# Patient Record
Sex: MALE | Race: ASIAN | NOT HISPANIC OR LATINO | URBAN - METROPOLITAN AREA
[De-identification: names, ages, dates, MRNs, and addresses within clinical notes are randomized per-mention and may not be internally consistent; named-entity substitution may affect disease eponyms.]

---

## 2023-09-14 VITALS
RESPIRATION RATE: 16 BRPM | HEIGHT: 67 IN | SYSTOLIC BLOOD PRESSURE: 195 MMHG | OXYGEN SATURATION: 99 % | WEIGHT: 153 LBS | DIASTOLIC BLOOD PRESSURE: 102 MMHG | HEART RATE: 64 BPM | TEMPERATURE: 97 F

## 2023-09-14 RX ORDER — CHLORHEXIDINE GLUCONATE 213 G/1000ML
1 SOLUTION TOPICAL ONCE
Refills: 0 | Status: DISCONTINUED | OUTPATIENT
Start: 2023-09-20 | End: 2023-10-04

## 2023-09-14 NOTE — H&P ADULT - HISTORY OF PRESENT ILLNESS
[facility]     Cardiologist: Dr. Laboy  Escort: To be determined on arrival   Pharmacy:     CONFIRM MEDICATIONS UPON ARRIVAL     Patient is a   69 y/o  Chinese speaking current smoking male with PMHx of HTN, HLD, carotid stenosis, PVD, kidney cyst, thyroid nodule, syncope (hospitalized 6-7 years prior) who presented to his cardiologist with complaints of intermittent, non-radiating, substernal chest pain, that has been intermittent, but gradually worsening over the past few weeks, occurring with moderate exertion (walking less than 2 blocks or climbing less than 1 flight of stairs), and alleviated with rest. Patient also complains of  dizziness .  Patient denies SOB, palpitations, fatigue, dizziness, headache, chills, orthopnea, LE edema, cough, PND, recent travel, or sick contacts.     CCTA 3/3/22: Calcium score is 492 agatson units. Severe mixed disease in prox LAD and RCA. Moderate mixed disease in the mid RCA. Remaining coronary artery stenosis appear non obstructive. Normal aortic dimensions. Normal LV function.     NST 3/27/23: Negative stress ECG for ischemia after adequate exercise. Abnormal myocardial perfusion showing a small amount of ischemia in the inferior location. Overall LV systolic function was normal without regional wall motion abnormalities.    TTE 3/15/23: EF=58%. Aortic valve sclerosis with mild calcification. Mild TR. No pericardial effusion. No pleural effusion. No pulmonary hypertension.     In light of patient's risk factors, CCS Class II anginal symptoms, and abnormal NST, patient presents for left heart cardiac catheterization with possible intervention if clinically indicated.  Cardiologist: Dr. Laboy  Escort: To be determined on arrival   Pharmacy: [facility], [address] [place] [address]    Patient is a  69 y/o  Chinese speaking current smoking male with PMHx of HTN, HLD, carotid stenosis, PVD, kidney cyst, thyroid nodule, syncope (hospitalized 6-7 years prior) who presented to his cardiologist with complaints of intermittent, non-radiating, substernal chest pain, that has been intermittent, but gradually worsening over the past few weeks, occurring with moderate exertion (walking less than 2 blocks or climbing less than 1 flight of stairs), and alleviated with rest. Patient also complains of  dizziness. Patient denies SOB, palpitations, fatigue, dizziness, headache, chills, orthopnea, LE edema, cough, PND, recent travel, or sick contacts.     CCTA (3/3/22): Calcium score is 492 agatson units. Severe mixed disease in prox LAD and RCA. Moderate mixed disease in the mid RCA. Remaining coronary artery stenosis appear non obstructive. Normal aortic dimensions. Normal LV function.     NST (3/27/23): Negative stress ECG for ischemia after adequate exercise. Abnormal myocardial perfusion showing a small amount of ischemia in the inferior location. Overall LV systolic function was normal without regional wall motion abnormalities.    TTE (3/15/23): EF=58%. Aortic valve sclerosis with mild calcification. Mild TR. No pericardial effusion. No pleural effusion. No pulmonary hypertension.     In light of patient's risk factors, CCS Class II anginal symptoms, and abnormal NST, patient presents for left heart cardiac catheterization with possible intervention if clinically indicated.

## 2023-09-14 NOTE — H&P ADULT - ASSESSMENT
Patient is a  67 y/o  Chinese speaking current smoking male with PMHx of HTN, HLD, carotid stenosis, PVD, kidney cyst, thyroid nodule, syncope (hospitalized 6-7 years prior) who in light of patient's risk factors, CCS Class II anginal symptoms, and abnormal NST, patient presents for left heart cardiac catheterization with possible intervention if clinically indicated.     ASA III				Mallampati class: II	            Anginal Class: II    - VS significant for /102 mmHg; patient is asymptomatic and states that he has taken both Losartan 50 mg and Coreg 3.125 mg this morning as prescribed; Dr. Mcnamara (interventional cardiology (IC) fellow) aware and agrees with plan for hydralazine 5 mg IVP and will further monitor and manage BP intra-procedurally.   - H/H stable, patient denies BRBPR, melena, hematuria, or further bleeding.   -  Sedation Plan: Moderate   - Patient Is Suitable Candidate For Sedation? Yes  - Cath Order Entered: Yes  - DAPT LOAD:  Patient is currently on ASA 81 mg daily (last dose 9/19/23; denies further missed doses and reports good adherence); ordered ASA 81 mg PO x1 and Plavix 600 mg POx1.  - PRE-CATH FLUIDS: Yes; patient is euvolemic on exam; Cr 1.02, EF 58% per TTE (3/2023). Discussed pre-cath IV fluid hydration with IC fellow, decision made to order IV NS 75 cc/hr x2 hours (reduced due to elevated BP).  - ALLERGY: no indication for pre-cath steroid prophylaxis   - Informed consent is in the patient's chart. Consent obtained via Language [employer] Video Cantonese  Chris ID #[id number].     Risks Benefits Annotation:     CARDIAC CATH: Risks & benefits of procedure and sedation and risks and benefits for the alternative therapy have been explained to the patient and/or HCP in layman’s terms including but not limited to: allergic reaction, bleeding, infection, arrhythmia, respiratory compromise, renal and vascular compromise, limb damage, MI, CVA, emergent CABG/Vascular Surgery and death. Informed consent obtained and in chart.

## 2023-09-14 NOTE — H&P ADULT - NSHPLABSRESULTS_GEN_ALL_CORE
LABS:                          14.6   6.78  )-----------( 241      ( 20 Sep 2023 08:45 )             43.4     09-20    139  |  103  |  22  ----------------------------<  120<H>  4.4   |  28  |  1.02    Ca    9.4      20 Sep 2023 09:13  Mg     2.3     09-20    TPro  7.2  /  Alb  4.6  /  TBili  0.5  /  DBili  x   /  AST  27  /  ALT  32  /  AlkPhos  67  09-20    LIVER FUNCTIONS - ( 20 Sep 2023 09:13 )  Alb: 4.6 g/dL / Pro: 7.2 g/dL / ALK PHOS: 67 U/L / ALT: 32 U/L / AST: 27 U/L / GGT: x           PT/INR - ( 20 Sep 2023 08:45 )   PT: 10.4 sec;   INR: 0.91          PTT - ( 20 Sep 2023 08:45 )  PTT:32.9 sec  Urinalysis Basic - ( 20 Sep 2023 09:13 )    Color: x / Appearance: x / SG: x / pH: x  Gluc: 120 mg/dL / Ketone: x  / Bili: x / Urobili: x   Blood: x / Protein: x / Nitrite: x   Leuk Esterase: x / RBC: x / WBC x   Sq Epi: x / Non Sq Epi: x / Bacteria: x      EKG 8/23/23: NSR @ 61 bpm

## 2023-09-14 NOTE — H&P ADULT - NSICDXPASTMEDICALHX_GEN_ALL_CORE_FT
PAST MEDICAL HISTORY:  Carotid stenosis     HLD (hyperlipidemia)     HTN (hypertension)      PAST MEDICAL HISTORY:  Carotid stenosis     History of thyroid nodule     HLD (hyperlipidemia)     HTN (hypertension)     Kidney cysts     PVD (peripheral vascular disease)

## 2023-09-20 ENCOUNTER — OUTPATIENT (OUTPATIENT)
Dept: OUTPATIENT SERVICES | Facility: HOSPITAL | Age: 68
LOS: 1 days | Discharge: HOME | End: 2023-09-20
Payer: MEDICARE

## 2023-09-20 LAB
A1C WITH ESTIMATED AVERAGE GLUCOSE RESULT: 5.8 % — HIGH (ref 4–5.6)
ALBUMIN SERPL ELPH-MCNC: 4.6 G/DL — SIGNIFICANT CHANGE UP (ref 3.3–5)
ALP SERPL-CCNC: 67 U/L — SIGNIFICANT CHANGE UP (ref 40–120)
ALT FLD-CCNC: 32 U/L — SIGNIFICANT CHANGE UP (ref 10–45)
ANION GAP SERPL CALC-SCNC: 8 MMOL/L — SIGNIFICANT CHANGE UP (ref 5–17)
ANION GAP SERPL CALC-SCNC: 8 MMOL/L — SIGNIFICANT CHANGE UP (ref 5–17)
APTT BLD: 32.9 SEC — SIGNIFICANT CHANGE UP (ref 24.5–35.6)
AST SERPL-CCNC: 27 U/L — SIGNIFICANT CHANGE UP (ref 10–40)
BASOPHILS # BLD AUTO: 0.11 K/UL — SIGNIFICANT CHANGE UP (ref 0–0.2)
BASOPHILS NFR BLD AUTO: 1.6 % — SIGNIFICANT CHANGE UP (ref 0–2)
BILIRUB SERPL-MCNC: 0.5 MG/DL — SIGNIFICANT CHANGE UP (ref 0.2–1.2)
BUN SERPL-MCNC: 18 MG/DL — SIGNIFICANT CHANGE UP (ref 7–23)
BUN SERPL-MCNC: 22 MG/DL — SIGNIFICANT CHANGE UP (ref 7–23)
CALCIUM SERPL-MCNC: 9.1 MG/DL — SIGNIFICANT CHANGE UP (ref 8.4–10.5)
CALCIUM SERPL-MCNC: 9.4 MG/DL — SIGNIFICANT CHANGE UP (ref 8.4–10.5)
CHLORIDE SERPL-SCNC: 103 MMOL/L — SIGNIFICANT CHANGE UP (ref 96–108)
CHLORIDE SERPL-SCNC: 106 MMOL/L — SIGNIFICANT CHANGE UP (ref 96–108)
CHOLEST SERPL-MCNC: 152 MG/DL — SIGNIFICANT CHANGE UP
CK MB CFR SERPL CALC: 2.2 NG/ML — SIGNIFICANT CHANGE UP (ref 0–6.7)
CK SERPL-CCNC: 141 U/L — SIGNIFICANT CHANGE UP (ref 30–200)
CO2 SERPL-SCNC: 26 MMOL/L — SIGNIFICANT CHANGE UP (ref 22–31)
CO2 SERPL-SCNC: 28 MMOL/L — SIGNIFICANT CHANGE UP (ref 22–31)
CREAT SERPL-MCNC: 0.84 MG/DL — SIGNIFICANT CHANGE UP (ref 0.5–1.3)
CREAT SERPL-MCNC: 1.02 MG/DL — SIGNIFICANT CHANGE UP (ref 0.5–1.3)
EGFR: 79 ML/MIN/1.73M2 — SIGNIFICANT CHANGE UP
EGFR: 94 ML/MIN/1.73M2 — SIGNIFICANT CHANGE UP
EOSINOPHIL # BLD AUTO: 0.44 K/UL — SIGNIFICANT CHANGE UP (ref 0–0.5)
EOSINOPHIL NFR BLD AUTO: 6.5 % — HIGH (ref 0–6)
ESTIMATED AVERAGE GLUCOSE: 120 MG/DL — HIGH (ref 68–114)
GLUCOSE SERPL-MCNC: 119 MG/DL — HIGH (ref 70–99)
GLUCOSE SERPL-MCNC: 120 MG/DL — HIGH (ref 70–99)
HCT VFR BLD CALC: 39.7 % — SIGNIFICANT CHANGE UP (ref 39–50)
HCT VFR BLD CALC: 43.4 % — SIGNIFICANT CHANGE UP (ref 39–50)
HCV AB S/CO SERPL IA: 0.05 S/CO — SIGNIFICANT CHANGE UP
HCV AB SERPL-IMP: SIGNIFICANT CHANGE UP
HDLC SERPL-MCNC: 50 MG/DL — SIGNIFICANT CHANGE UP
HGB BLD-MCNC: 13.8 G/DL — SIGNIFICANT CHANGE UP (ref 13–17)
HGB BLD-MCNC: 14.6 G/DL — SIGNIFICANT CHANGE UP (ref 13–17)
IMM GRANULOCYTES NFR BLD AUTO: 0.1 % — SIGNIFICANT CHANGE UP (ref 0–0.9)
INR BLD: 0.91 — SIGNIFICANT CHANGE UP (ref 0.85–1.18)
LIPID PNL WITH DIRECT LDL SERPL: 83 MG/DL — SIGNIFICANT CHANGE UP
LYMPHOCYTES # BLD AUTO: 2.27 K/UL — SIGNIFICANT CHANGE UP (ref 1–3.3)
LYMPHOCYTES # BLD AUTO: 33.5 % — SIGNIFICANT CHANGE UP (ref 13–44)
MAGNESIUM SERPL-MCNC: 2.1 MG/DL — SIGNIFICANT CHANGE UP (ref 1.6–2.6)
MAGNESIUM SERPL-MCNC: 2.3 MG/DL — SIGNIFICANT CHANGE UP (ref 1.6–2.6)
MCHC RBC-ENTMCNC: 31.3 PG — SIGNIFICANT CHANGE UP (ref 27–34)
MCHC RBC-ENTMCNC: 31.7 PG — SIGNIFICANT CHANGE UP (ref 27–34)
MCHC RBC-ENTMCNC: 33.6 GM/DL — SIGNIFICANT CHANGE UP (ref 32–36)
MCHC RBC-ENTMCNC: 34.8 GM/DL — SIGNIFICANT CHANGE UP (ref 32–36)
MCV RBC AUTO: 91.3 FL — SIGNIFICANT CHANGE UP (ref 80–100)
MCV RBC AUTO: 92.9 FL — SIGNIFICANT CHANGE UP (ref 80–100)
MONOCYTES # BLD AUTO: 0.58 K/UL — SIGNIFICANT CHANGE UP (ref 0–0.9)
MONOCYTES NFR BLD AUTO: 8.6 % — SIGNIFICANT CHANGE UP (ref 2–14)
NEUTROPHILS # BLD AUTO: 3.37 K/UL — SIGNIFICANT CHANGE UP (ref 1.8–7.4)
NEUTROPHILS NFR BLD AUTO: 49.7 % — SIGNIFICANT CHANGE UP (ref 43–77)
NON HDL CHOLESTEROL: 102 MG/DL — SIGNIFICANT CHANGE UP
NRBC # BLD: 0 /100 WBCS — SIGNIFICANT CHANGE UP (ref 0–0)
NRBC # BLD: 0 /100 WBCS — SIGNIFICANT CHANGE UP (ref 0–0)
PLATELET # BLD AUTO: 214 K/UL — SIGNIFICANT CHANGE UP (ref 150–400)
PLATELET # BLD AUTO: 241 K/UL — SIGNIFICANT CHANGE UP (ref 150–400)
POTASSIUM SERPL-MCNC: 3.7 MMOL/L — SIGNIFICANT CHANGE UP (ref 3.5–5.3)
POTASSIUM SERPL-MCNC: 4.4 MMOL/L — SIGNIFICANT CHANGE UP (ref 3.5–5.3)
POTASSIUM SERPL-SCNC: 3.7 MMOL/L — SIGNIFICANT CHANGE UP (ref 3.5–5.3)
POTASSIUM SERPL-SCNC: 4.4 MMOL/L — SIGNIFICANT CHANGE UP (ref 3.5–5.3)
PROT SERPL-MCNC: 7.2 G/DL — SIGNIFICANT CHANGE UP (ref 6–8.3)
PROTHROM AB SERPL-ACNC: 10.4 SEC — SIGNIFICANT CHANGE UP (ref 9.5–13)
RBC # BLD: 4.35 M/UL — SIGNIFICANT CHANGE UP (ref 4.2–5.8)
RBC # BLD: 4.67 M/UL — SIGNIFICANT CHANGE UP (ref 4.2–5.8)
RBC # FLD: 13 % — SIGNIFICANT CHANGE UP (ref 10.3–14.5)
RBC # FLD: 13.1 % — SIGNIFICANT CHANGE UP (ref 10.3–14.5)
SODIUM SERPL-SCNC: 139 MMOL/L — SIGNIFICANT CHANGE UP (ref 135–145)
SODIUM SERPL-SCNC: 140 MMOL/L — SIGNIFICANT CHANGE UP (ref 135–145)
TRIGL SERPL-MCNC: 97 MG/DL — SIGNIFICANT CHANGE UP
WBC # BLD: 6.78 K/UL — SIGNIFICANT CHANGE UP (ref 3.8–10.5)
WBC # BLD: 8.18 K/UL — SIGNIFICANT CHANGE UP (ref 3.8–10.5)
WBC # FLD AUTO: 6.78 K/UL — SIGNIFICANT CHANGE UP (ref 3.8–10.5)
WBC # FLD AUTO: 8.18 K/UL — SIGNIFICANT CHANGE UP (ref 3.8–10.5)

## 2023-09-20 PROCEDURE — 85027 COMPLETE CBC AUTOMATED: CPT

## 2023-09-20 PROCEDURE — 93010 ELECTROCARDIOGRAM REPORT: CPT

## 2023-09-20 PROCEDURE — 36415 COLL VENOUS BLD VENIPUNCTURE: CPT

## 2023-09-20 PROCEDURE — 85730 THROMBOPLASTIN TIME PARTIAL: CPT

## 2023-09-20 PROCEDURE — 80061 LIPID PANEL: CPT

## 2023-09-20 PROCEDURE — C1894: CPT

## 2023-09-20 PROCEDURE — C1874: CPT

## 2023-09-20 PROCEDURE — 92978 ENDOLUMINL IVUS OCT C 1ST: CPT | Mod: LD

## 2023-09-20 PROCEDURE — 82550 ASSAY OF CK (CPK): CPT

## 2023-09-20 PROCEDURE — C1753: CPT

## 2023-09-20 PROCEDURE — 99152 MOD SED SAME PHYS/QHP 5/>YRS: CPT

## 2023-09-20 PROCEDURE — C1725: CPT

## 2023-09-20 PROCEDURE — 80048 BASIC METABOLIC PNL TOTAL CA: CPT

## 2023-09-20 PROCEDURE — 93005 ELECTROCARDIOGRAM TRACING: CPT

## 2023-09-20 PROCEDURE — C9600: CPT | Mod: LD

## 2023-09-20 PROCEDURE — 83036 HEMOGLOBIN GLYCOSYLATED A1C: CPT

## 2023-09-20 PROCEDURE — 99153 MOD SED SAME PHYS/QHP EA: CPT

## 2023-09-20 PROCEDURE — 92928 PRQ TCAT PLMT NTRAC ST 1 LES: CPT | Mod: LD

## 2023-09-20 PROCEDURE — C1887: CPT

## 2023-09-20 PROCEDURE — C1769: CPT

## 2023-09-20 PROCEDURE — 93458 L HRT ARTERY/VENTRICLE ANGIO: CPT | Mod: 26,59

## 2023-09-20 PROCEDURE — 92978 ENDOLUMINL IVUS OCT C 1ST: CPT | Mod: 26,LD

## 2023-09-20 PROCEDURE — 86803 HEPATITIS C AB TEST: CPT

## 2023-09-20 PROCEDURE — 93458 L HRT ARTERY/VENTRICLE ANGIO: CPT | Mod: 59

## 2023-09-20 PROCEDURE — 85025 COMPLETE CBC W/AUTO DIFF WBC: CPT

## 2023-09-20 PROCEDURE — 82553 CREATINE MB FRACTION: CPT

## 2023-09-20 PROCEDURE — 80053 COMPREHEN METABOLIC PANEL: CPT

## 2023-09-20 PROCEDURE — 83735 ASSAY OF MAGNESIUM: CPT

## 2023-09-20 PROCEDURE — 85610 PROTHROMBIN TIME: CPT

## 2023-09-20 PROCEDURE — 85347 COAGULATION TIME ACTIVATED: CPT

## 2023-09-20 RX ORDER — POTASSIUM CHLORIDE 20 MEQ
40 PACKET (EA) ORAL ONCE
Refills: 0 | Status: DISCONTINUED | OUTPATIENT
Start: 2023-09-20 | End: 2023-10-04

## 2023-09-20 RX ORDER — ASPIRIN/CALCIUM CARB/MAGNESIUM 324 MG
81 TABLET ORAL DAILY
Refills: 0 | Status: DISCONTINUED | OUTPATIENT
Start: 2023-09-20 | End: 2023-10-04

## 2023-09-20 RX ORDER — ATORVASTATIN CALCIUM 80 MG/1
1 TABLET, FILM COATED ORAL
Qty: 90 | Refills: 0
Start: 2023-09-20

## 2023-09-20 RX ORDER — CLOPIDOGREL BISULFATE 75 MG/1
600 TABLET, FILM COATED ORAL ONCE
Refills: 0 | Status: COMPLETED | OUTPATIENT
Start: 2023-09-20 | End: 2023-09-20

## 2023-09-20 RX ORDER — HYDRALAZINE HCL 50 MG
5 TABLET ORAL ONCE
Refills: 0 | Status: COMPLETED | OUTPATIENT
Start: 2023-09-20 | End: 2023-09-20

## 2023-09-20 RX ORDER — SODIUM CHLORIDE 9 MG/ML
1000 INJECTION INTRAMUSCULAR; INTRAVENOUS; SUBCUTANEOUS
Refills: 0 | Status: DISCONTINUED | OUTPATIENT
Start: 2023-09-20 | End: 2023-10-04

## 2023-09-20 RX ORDER — SODIUM CHLORIDE 9 MG/ML
500 INJECTION INTRAMUSCULAR; INTRAVENOUS; SUBCUTANEOUS
Refills: 0 | Status: DISCONTINUED | OUTPATIENT
Start: 2023-09-20 | End: 2023-10-04

## 2023-09-20 RX ADMIN — CLOPIDOGREL BISULFATE 600 MILLIGRAM(S): 75 TABLET, FILM COATED ORAL at 09:56

## 2023-09-20 RX ADMIN — SODIUM CHLORIDE 200 MILLILITER(S): 9 INJECTION INTRAMUSCULAR; INTRAVENOUS; SUBCUTANEOUS at 12:07

## 2023-09-20 RX ADMIN — Medication 5 MILLIGRAM(S): at 09:56

## 2023-09-20 RX ADMIN — Medication 81 MILLIGRAM(S): at 09:56

## 2023-09-20 RX ADMIN — SODIUM CHLORIDE 75 MILLILITER(S): 9 INJECTION INTRAMUSCULAR; INTRAVENOUS; SUBCUTANEOUS at 09:58

## 2023-09-20 NOTE — PROGRESS NOTE ADULT - SUBJECTIVE AND OBJECTIVE BOX
INTERVENTIONAL CARDIOLOGY PA POST PCI SDA DISCHARGE NOTE    Patient without complaints. Ambulated and voided without difficulties    Afebrile, VSS    PRESCRIPTIONS/HOME MEDICATIONS:  ·	Aspirin 81mg daily   ·	Coreg 3.125mg BID   ·	Losartan 50mg daily   ·	NTG SL 0.4mg every 5 mins PRN for CP    CURRENT MEDICATIONS:   aspirin enteric coated 81 milliGRAM(s) Oral daily  chlorhexidine 4% Liquid 1 Application(s) Topical once  sodium chloride 0.9%. 1000 milliLiter(s) IV Continuous <Continuous>  sodium chloride 0.9%. 500 milliLiter(s) IV Continuous <Continuous>      PHYSICAL EXAM:    Ext - Rt Radial: Hemostasis achieved with manual release of Hemoband. +2 pulses; sensations intact. Dressing C/D/I -- no oozing, bleeding, or hematoma noted    LABS:     POST CATH EKG:      A/P:      68M Cantonese-speaking and current smoker with PMHx of HTN, HLD, carotid stenosis, PVD, kidney cyst, thyroid nodule, syncope (hospitalized 6-7 years prior), who in light of patient's risk factors, CCS Class II anginal symptoms, and abnormal NST, patient presents for left heart cardiac catheterization with possible intervention if clinically indicated.     Pt is now s/p C (9/20/23): mLAD 90% s/p BARBY, pD1 80%, mRCA 90%; LM normal, LCx MLI; EDP 5 -- via RRA; no complications [Interventionalist: Dr. TAYA Mcconnell]. Pt is planned for Staged PCI in 5 weeks.    - Follow-up with PMD/Cardiologist Narda in 72 hours  - Post procedure labs/EKG reviewed and stable.    - Pt given instructions on importance of taking antiplatelet medication  - Stable for discharge as per attending Dr. Mcconnell after bed rest, pt voids, wrist stable and 30 minutes of ambulation  - Prescriptions for Aspirin 81mg & Plavix 75mg daily e-prescribed and submitted to patient's pharmacy   - Patient will start Atorvastatin 40mg qhs -- e-prescribed and submitted to patient's pharmacy   - Patient will continue all other Home Medications as listed above   - Is patient a Current Smoker: YES -- Patient was counseled on importance of smoking cessation.   - Discharge forms signed and copies in chart   - Given residual pD1 and mRCA lesions, pt is recommended for staged PCI in 5 weeks        INTERVENTIONAL CARDIOLOGY PA POST PCI SDA DISCHARGE NOTE    Patient without complaints. Ambulated and voided without difficulties    Afebrile, VSS    PRESCRIPTIONS/HOME MEDICATIONS:  ·	Aspirin 81mg daily   ·	Coreg 3.125mg BID   ·	Losartan 50mg daily   ·	NTG SL 0.4mg every 5 mins PRN for CP    CURRENT MEDICATIONS:   aspirin enteric coated 81 milliGRAM(s) Oral daily  chlorhexidine 4% Liquid 1 Application(s) Topical once  sodium chloride 0.9%. 1000 milliLiter(s) IV Continuous <Continuous>  sodium chloride 0.9%. 500 milliLiter(s) IV Continuous <Continuous>      PHYSICAL EXAM:    Ext - Rt Radial: Hemostasis achieved with manual release of Hemoband. +2 pulses; sensations intact. Dressing C/D/I -- no oozing, bleeding, or hematoma noted    LABS:                           13.8   8.18  )-----------( 214      ( 20 Sep 2023 13:44 )             39.7     09-20    140  |  106  |  18  ----------------------------<  119<H>  3.7   |  26  |  0.84    Ca    9.1      20 Sep 2023 13:44  Mg     2.1     09-20    TPro  7.2  /  Alb  4.6  /  TBili  0.5  /  DBili  x   /  AST  27  /  ALT  32  /  AlkPhos  67  09-20      POST CATH EKG: NSR at 70bpm -- unchanged from prior       A/P:      68M Cantonese-speaking and current smoker with PMHx of HTN, HLD, carotid stenosis, PVD, kidney cyst, thyroid nodule, syncope (hospitalized 6-7 years prior), who in light of patient's risk factors, CCS Class II anginal symptoms, and abnormal NST, patient presents for left heart cardiac catheterization with possible intervention if clinically indicated.     Pt is now s/p [facility] (9/20/23): mLAD 90% s/p BARBY, pD1 80%, mRCA 90%; LM normal, LCx MLI; EDP 5 -- via RRA; no complications [Interventionalist: Dr. TAYA Mcconnell]. Pt is planned for Staged PCI in 5 weeks.    - Follow-up with PMD/Cardiologist Narda in 72 hours  - Post procedure labs/EKG reviewed and stable.    - Pt given instructions on importance of taking antiplatelet medication  - Stable for discharge as per attending Dr. Mcconnell after bed rest, pt voids, wrist stable and 30 minutes of ambulation  - Prescriptions for Aspirin 81mg & Plavix 75mg daily e-prescribed and submitted to patient's pharmacy   - Patient will start Atorvastatin 40mg qhs -- e-prescribed and submitted to patient's pharmacy   - Patient will continue all other Home Medications as listed above   - Is patient a Current Smoker: YES -- Patient was counseled on importance of smoking cessation.   - Discharge forms signed and copies in chart   - Given residual pD1 and mRCA lesions, pt is recommended for staged PCI in 5 weeks

## 2023-09-21 RX ORDER — ASPIRIN/CALCIUM CARB/MAGNESIUM 324 MG
1 TABLET ORAL
Qty: 90 | Refills: 0
Start: 2023-09-21

## 2023-09-21 RX ORDER — CLOPIDOGREL BISULFATE 75 MG/1
1 TABLET, FILM COATED ORAL
Qty: 90 | Refills: 0
Start: 2023-09-21

## 2023-09-29 DIAGNOSIS — I25.110 ATHEROSCLEROTIC HEART DISEASE OF NATIVE CORONARY ARTERY WITH UNSTABLE ANGINA PECTORIS: ICD-10-CM

## 2023-09-29 DIAGNOSIS — R94.39 ABNORMAL RESULT OF OTHER CARDIOVASCULAR FUNCTION STUDY: ICD-10-CM

## 2023-09-29 DIAGNOSIS — I25.84 CORONARY ATHEROSCLEROSIS DUE TO CALCIFIED CORONARY LESION: ICD-10-CM

## 2023-10-02 LAB — ISTAT ACTK (ACTIVATED CLOTTING TIME KAOLIN): 275 SEC — HIGH (ref 74–137)

## 2023-10-18 PROBLEM — E78.5 HYPERLIPIDEMIA, UNSPECIFIED: Chronic | Status: ACTIVE | Noted: 2023-09-14

## 2023-10-18 PROBLEM — I10 ESSENTIAL (PRIMARY) HYPERTENSION: Chronic | Status: ACTIVE | Noted: 2023-09-14

## 2023-10-18 PROBLEM — Z86.39 PERSONAL HISTORY OF OTHER ENDOCRINE, NUTRITIONAL AND METABOLIC DISEASE: Chronic | Status: ACTIVE | Noted: 2023-09-20

## 2023-10-18 PROBLEM — I73.9 PERIPHERAL VASCULAR DISEASE, UNSPECIFIED: Chronic | Status: ACTIVE | Noted: 2023-09-20

## 2023-10-18 PROBLEM — N28.1 CYST OF KIDNEY, ACQUIRED: Chronic | Status: ACTIVE | Noted: 2023-09-20

## 2023-10-18 PROBLEM — I65.29 OCCLUSION AND STENOSIS OF UNSPECIFIED CAROTID ARTERY: Chronic | Status: ACTIVE | Noted: 2023-09-14

## 2023-10-23 VITALS
HEART RATE: 67 BPM | DIASTOLIC BLOOD PRESSURE: 84 MMHG | OXYGEN SATURATION: 100 % | RESPIRATION RATE: 15 BRPM | SYSTOLIC BLOOD PRESSURE: 164 MMHG | TEMPERATURE: 98 F | WEIGHT: 153 LBS | HEIGHT: 66.93 IN

## 2023-10-23 RX ORDER — CHLORHEXIDINE GLUCONATE 213 G/1000ML
1 SOLUTION TOPICAL ONCE
Refills: 0 | Status: DISCONTINUED | OUTPATIENT
Start: 2023-10-25 | End: 2023-11-08

## 2023-10-23 NOTE — H&P ADULT - HISTORY OF PRESENT ILLNESS
Cardiologist: Dr. Krystle Laboy  Escort:  Pharmacy:  [facility], [address] [place] [id number]    67 yo Cantonese Speaking M, former smoker, with a PMH of HTN, HLD, PVD, CAD s/p PCI of LAD in 9/2023 (residual RCA/D1 disease), known kidney cyst (6.1 cm) and thyroid nodule (L) who presented to outpatient cardiologist Dr. Laboy for follow up after recent cardiac cath. Since procedure, patient endorses improved symptoms however still has some episodes of mild chest pain. Denies SOB, dizziness, diaphoresis, palpitations, orthopnea/PND, BRBPR, hematuria, melena, LE swelling. Endorses compliance with DAPT. In light of patient's risk factors and known residual disease, patient presents for left heart cardiac catheterization with planned intervention.     S/p LHC (9/20/23): mLAD 90% s/p BARBY, pD1 80%, mRCA 90%; LM normal, LCx MLI; EDP 5 -- via RRA; no complications [Interventionalist: Dr. TAYA Mcconnell]. Pt is planned for Staged PCI in 5 weeks.   TTE (3/15/23): EF 58%. Aortic valve sclerosis with mild calcification. Mild TR. No pericardial effusion. No pleural effusion. No pulmonary hypertension.    Cardiologist: Dr. Krystle Laboy  Escort:  Pharmacy:  [facility], [address] [place] [id number]    67 yo Cantonese Speaking M, former smoker, with a PMH of HTN, HLD, PVD, CAD s/p PCI of LAD in 9/2023 (residual RCA/D1 disease), known kidney cyst (6.1 cm) and thyroid nodule (L) who presented to outpatient cardiologist Dr. Laboy for follow up after recent cardiac cath. Since procedure, patient endorses improved symptoms however still has some episodes of mild chest pain. Denies SOB, dizziness, diaphoresis, palpitations, orthopnea/PND, BRBPR, hematuria, melena, LE swelling. Endorses compliance with DAPT. In light of patient's risk factors and known residual disease, patient presents for left heart cardiac catheterization with planned intervention.     S/p LHC (9/20/23): mLAD 90% s/p BARBY, pD1 80%, mRCA 90%; LM normal, LCx MLI; EDP 5 -- via RRA; no complications [Interventionalist: Dr. TAYA Mcconnell]. Pt is planned for Staged PCI in 5 weeks.   TTE (3/15/23): EF 58%. Aortic valve sclerosis with mild calcification. Mild TR. No pericardial effusion. No pleural effusion. No pulmonary hypertension.    Cardiologist: Dr. Krystle Laboy  Escort:  Pharmacy:  [facility], [address] [place] [id number]    67 yo Cantonese Speaking M, former smoker, with a PMH of HTN, HLD, PVD, CAD s/p PCI of LAD in 9/2023 (residual RCA/D1 disease), known kidney cyst (6.1 cm) and thyroid nodule (L) who presented to outpatient cardiologist Dr. Laboy for follow up after recent cardiac cath. Since procedure, patient endorses improved symptoms however still has some episodes of mild chest pain. Denies SOB, dizziness, diaphoresis, palpitations, orthopnea/PND, BRBPR, hematuria, melena, LE swelling. Endorses compliance with DAPT. In light of patient's risk factors and known residual disease, patient presents for left heart cardiac catheterization with planned intervention.     S/p LHC (9/20/23): mLAD 90% s/p BARBY, pD1 80%, mRCA 90%; LM normal, LCx MLI; EDP 5 -- via RRA; no complications [Interventionalist: Dr. TAYA Mcconnell]. Pt is planned for Staged PCI in 5 weeks.   TTE (3/15/23): EF 58%. Aortic valve sclerosis with mild calcification. Mild TR. No pericardial effusion. No pleural effusion. No pulmonary hypertension.    Cardiologist: Dr. Krystle Laboy  Escort: cony Valera   Pharmacy:  [facility], [address] [place] [phone]    67 yo Cantonese Speaking M, former smoker, with a PMH of HTN, HLD, PVD, CAD s/p PCI of LAD in 9/2023 (residual RCA/D1 disease), known kidney cyst (6.1 cm) and thyroid nodule (L) who presented to outpatient cardiologist Dr. Laboy for follow up after recent cardiac cath. Since procedure, patient endorses improved symptoms however still has some episodes of mild chest pain. Denies SOB, dizziness, diaphoresis, palpitations, orthopnea/PND, BRBPR, hematuria, melena, LE swelling. Pt endorses full compliance with DAPT in the last two weeks. In light of patient's risk factors and known residual disease, patient presents for left heart cardiac catheterization with planned intervention.     S/p LHC (9/20/23): mLAD 90% s/p BARBY, pD1 80%, mRCA 90%; LM normal, LCx MLI; EDP 5 -- via RRA; no complications [Interventionalist: Dr. TAYA Mcconnell]. Pt is planned for Staged PCI in 5 weeks.   TTE (3/15/23): EF 58%. Aortic valve sclerosis with mild calcification. Mild TR. No pericardial effusion. No pleural effusion. No pulmonary hypertension.    Cardiologist: Dr. Krystle Laboy  Escort: cony Valera   Pharmacy:  [facility], [address] [place] [phone]    67 yo Cantonese Speaking M, former smoker, with a PMH of HTN, HLD, PVD, CAD s/p PCI of LAD in 9/2023 (residual RCA/D1 disease), known kidney cyst (6.1 cm) and thyroid nodule (L) who presented to outpatient cardiologist Dr. Laboy for follow up after recent cardiac cath. Since procedure, patient endorses improved symptoms however still has some episodes of mild chest pain. Denies SOB, dizziness, diaphoresis, palpitations, orthopnea/PND, BRBPR, hematuria, melena, LE swelling. Pt endorses full compliance with DAPT in the last two weeks. In light of patient's risk factors and known residual disease, patient presents for left heart cardiac catheterization with planned intervention.     S/p LHC (9/20/23): mLAD 90% s/p BARBY, pD1 80%, mRCA 90%; LM normal, LCx MLI; EDP 5 -- via RRA; no complications [Interventionalist: Dr. TAYA Mcconnell]. Pt is planned for Staged PCI in 5 weeks.   TTE (3/15/23): EF 58%. Aortic valve sclerosis with mild calcification. Mild TR. No pericardial effusion. No pleural effusion. No pulmonary hypertension.    Cardiologist: Dr. Krystle Laboy  Escort: cony Valera   Pharmacy:  [facility], [address] [place] [id number]    69 yo Cantonese Speaking M, former smoker, with a PMH of HTN, HLD, PVD, CAD s/p PCI of LAD in 9/2023 (residual RCA/D1 disease), known kidney cyst (6.1 cm) and thyroid nodule (L) who presented to outpatient cardiologist Dr. Laboy for follow up after recent cardiac cath. Since procedure, patient endorses improved symptoms however still has some episodes of mild chest pain. Denies SOB, dizziness, diaphoresis, palpitations, orthopnea/PND, BRBPR, hematuria, melena, LE swelling. Pt endorses full compliance with DAPT in the last two weeks. In light of patient's risk factors and known residual disease, patient presents for left heart cardiac catheterization with planned intervention.     S/p LHC (9/20/23): mLAD 90% s/p BARBY, pD1 80%, mRCA 90%; LM normal, LCx MLI; EDP 5 -- via RRA; no complications [Interventionalist: Dr. TAYA Mcconnell]. Pt is planned for Staged PCI in 5 weeks.   TTE (3/15/23): EF 58%. Aortic valve sclerosis with mild calcification. Mild TR. No pericardial effusion. No pleural effusion. No pulmonary hypertension.    Cardiologist: Dr. Krystle Laboy  Escort: cony Valera   Pharmacy:  [facility] in [place]    67 yo Cantonese Speaking M, former smoker, with a PMH of HTN, HLD, PVD, CAD s/p PCI of LAD in 9/2023 (residual RCA/D1 disease), known kidney cyst (6.1 cm) and thyroid nodule (L) who presented to outpatient cardiologist Dr. Laboy for follow up after recent cardiac cath. Since procedure, patient endorses improved symptoms however still has some episodes of mild chest pain. Denies SOB, dizziness, diaphoresis, palpitations, orthopnea/PND, BRBPR, hematuria, melena, LE swelling. Pt endorses full compliance with DAPT in the last two weeks. In light of patient's risk factors and known residual disease, patient presents for left heart cardiac catheterization with planned intervention.     S/p LHC (9/20/23): mLAD 90% s/p BARBY, pD1 80%, mRCA 90%; LM normal, LCx MLI; EDP 5 -- via RRA; no complications [Interventionalist: Dr. TAYA Mcconnell]. Pt is planned for Staged PCI in 5 weeks.   TTE (3/15/23): EF 58%. Aortic valve sclerosis with mild calcification. Mild TR. No pericardial effusion. No pleural effusion. No pulmonary hypertension.    Cardiologist: Dr. Krystle Laboy  Escort: cony Valera   Pharmacy:  [facility] in [place]    69 yo Cantonese Speaking M, former smoker, with a PMH of HTN, HLD, PVD, CAD s/p PCI of LAD in 9/2023 (residual RCA/D1 disease), known kidney cyst (6.1 cm) and thyroid nodule (L) who presented to outpatient cardiologist Dr. Laboy for follow up after recent cardiac cath. Since procedure, patient endorses improved symptoms however still has some episodes of mild chest pain. Denies SOB, dizziness, diaphoresis, palpitations, orthopnea/PND, BRBPR, hematuria, melena, LE swelling. Pt endorses full compliance with DAPT in the last two weeks. In light of patient's risk factors and known residual disease, patient presents for left heart cardiac catheterization with planned intervention.     S/p LHC (9/20/23): mLAD 90% s/p BARBY, pD1 80%, mRCA 90%; LM normal, LCx MLI; EDP 5 -- via RRA; no complications [Interventionalist: Dr. TAYA Mcconnell]. Pt is planned for Staged PCI in 5 weeks.   TTE (3/15/23): EF 58%. Aortic valve sclerosis with mild calcification. Mild TR. No pericardial effusion. No pleural effusion. No pulmonary hypertension.    Cardiologist: Dr. Krystle Laboy  Escort: cony Valera   Pharmacy:  [facility] in [place]    69 yo Cantonese Speaking M, former smoker, with a PMH of HTN, HLD, PVD, CAD s/p PCI of LAD in 9/2023 (residual RCA/D1 disease), known kidney cyst (6.1 cm) and thyroid nodule (L) who presented to outpatient cardiologist Dr. Laboy for follow up after recent cardiac cath. Since procedure, patient endorses improved symptoms however still has some episodes of mild chest pain. Denies SOB, dizziness, diaphoresis, palpitations, orthopnea/PND, BRBPR, hematuria, melena, LE swelling. Pt endorses full compliance with DAPT in the last two weeks. In light of patient's risk factors and known residual disease, patient presents for left heart cardiac catheterization with planned intervention.     S/p LHC (9/20/23): mLAD 90% s/p BARBY, pD1 80%, mRCA 90%; LM normal, LCx MLI; EDP 5 -- via RRA; no complications [Interventionalist: Dr. TAYA Mcconnell]. Pt is planned for Staged PCI in 5 weeks.   TTE (3/15/23): EF 58%. Aortic valve sclerosis with mild calcification. Mild TR. No pericardial effusion. No pleural effusion. No pulmonary hypertension.    Cardiologist: Dr. Krystle Laboy  Escort: cony Valera   Pharmacy:  [facility] in [place]    67 yo Cantonese Speaking M, former smoker, with a PMH of HTN, HLD, PVD, CAD s/p PCI of LAD in 9/2023 (residual RCA/D1 disease), known kidney cyst (6.1 cm) and thyroid nodule (L) who presented to outpatient cardiologist Dr. Laboy for follow up after recent cardiac cath. Since procedure, patient endorses improved symptoms however still has some episodes of mild chest pain. Denies SOB, dizziness, diaphoresis, palpitations, orthopnea/PND, BRBPR, hematuria, melena, LE swelling. Pt endorses full compliance with DAPT in the last two weeks. In light of patient's risk factors and known residual disease, patient presents for staged left heart cardiac catheterization with planned intervention.     S/p LHC (9/20/23): mLAD 90% s/p BARBY, pD1 80%, mRCA 90%; LM normal, LCx MLI; EDP 5 -- via RRA; no complications [Interventionalist: Dr. TAYA Mcconnell]. Pt is planned for Staged PCI in 5 weeks.   TTE (3/15/23): EF 58%. Aortic valve sclerosis with mild calcification. Mild TR. No pericardial effusion. No pleural effusion. No pulmonary hypertension.    Cardiologist: Dr. Krystle Laboy  Escort: cony Valera   Pharmacy:  [facility] in [place]    67 yo Cantonese Speaking M, former smoker, with a PMH of HTN, HLD, PVD, CAD s/p PCI of LAD in 9/2023 (residual RCA/D1 disease), known kidney cyst (6.1 cm) and thyroid nodule (L) who presented to outpatient cardiologist Dr. Laboy for follow up after recent cardiac cath. Since procedure, patient endorses improved symptoms however still has some episodes of mild chest pain. Denies SOB, dizziness, diaphoresis, palpitations, orthopnea/PND, BRBPR, hematuria, melena, LE swelling. Pt endorses full compliance with DAPT in the last two weeks. In light of patient's risk factors and known residual disease, patient presents for staged left heart cardiac catheterization with planned intervention.     S/p LHC (9/20/23): mLAD 90% s/p BARBY, pD1 80%, mRCA 90%; LM normal, LCx MLI; EDP 5 -- via RRA; no complications [Interventionalist: Dr. TAYA Mcconnell]. Pt is planned for Staged PCI in 5 weeks.   TTE (3/15/23): EF 58%. Aortic valve sclerosis with mild calcification. Mild TR. No pericardial effusion. No pleural effusion. No pulmonary hypertension.    Cardiologist: Dr. Krystle Laboy  Escort: cony Valera   Pharmacy:  [facility] in [place]    69 yo Cantonese Speaking M, former smoker, with a PMH of HTN, HLD, PVD, CAD s/p PCI of LAD in 9/2023 (residual RCA/D1 disease), known kidney cyst (6.1 cm) and thyroid nodule (L) who presented to outpatient cardiologist Dr. Laboy for follow up after recent cardiac cath. Since procedure, patient endorses improved symptoms however still has some episodes of mild chest pain. Denies SOB, dizziness, diaphoresis, palpitations, orthopnea/PND, BRBPR, hematuria, melena, LE swelling. Pt endorses full compliance with DAPT in the last two weeks. In light of patient's risk factors and known residual disease, patient presents for staged left heart cardiac catheterization with planned intervention.     S/p LHC (9/20/23): mLAD 90% s/p BARBY, pD1 80%, mRCA 90%; LM normal, LCx MLI; EDP 5 -- via RRA; no complications [Interventionalist: Dr. TAYA Mcconnell]. Pt is planned for Staged PCI in 5 weeks.   TTE (3/15/23): EF 58%. Aortic valve sclerosis with mild calcification. Mild TR. No pericardial effusion. No pleural effusion. No pulmonary hypertension.

## 2023-10-23 NOTE — H&P ADULT - NSHPLABSRESULTS_GEN_ALL_CORE
15.1   10.23 )-----------( 251      ( 25 Oct 2023 09:35 )             44.5       10-25    140  |  104  |  21  ----------------------------<  111<H>  4.1   |  28  |  0.86    Ca    9.3      25 Oct 2023 09:35  Mg     2.3     10-25    TPro  7.4  /  Alb  4.6  /  TBili  0.8  /  DBili  x   /  AST  20  /  ALT  20  /  AlkPhos  65  10-25      PT/INR - ( 25 Oct 2023 09:35 )   PT: 11.0 sec;   INR: 0.96          PTT - ( 25 Oct 2023 09:35 )  PTT:32.1 sec    CARDIAC MARKERS ( 25 Oct 2023 09:35 )  x     / x     / 132 U/L / x     / 2.0 ng/mL        Urinalysis Basic - ( 25 Oct 2023 09:35 )    Color: x / Appearance: x / SG: x / pH: x  Gluc: 111 mg/dL / Ketone: x  / Bili: x / Urobili: x   Blood: x / Protein: x / Nitrite: x   Leuk Esterase: x / RBC: x / WBC x   Sq Epi: x / Non Sq Epi: x / Bacteria: x

## 2023-10-23 NOTE — H&P ADULT - ASSESSMENT
67 yo Cantonese Speaking M, former smoker, with a PMH of HTN, HLD, PVD, CAD s/p PCI of LAD in 9/2023 (residual RCA/D1 disease), known kidney cyst (6.1 cm) and thyroid nodule (L) who presented to outpatient cardiologist Dr. Laboy for follow up after recent cardiac cath reporting improved symptoms however still has some episodes of mild chest pain; In light of risk factors and known residual disease, patient presents for staged left heart cardiac catheterization with planned intervention.     EKG: 	NSR 72bpm; 1mm PRANAY V2, flattened TW III, AVL; nearly meets voltage criteria for LVH in precordial leads		  ASA 	III  Mallampati class: II  Anginal Class: II    -No Known Allergies    -H/H = 15.1/44.5  . Pt denies BRBPR, hematuria, hematochezia, melena. Pt endorses compliance w/ home Aspirin 81mg and Plavix 75mg, stating last dose was today AM. Pt endorses full compliance with DAPT in the last two weeks.  -BUN/Cr = 21/0.86  . EF 58%. Euvolemic on exam. IV NS @ 250cc bolus followed by 75cc/hr x 2hrs started pre procedure    Sedation Plan:   Moderate  Patient Is Suitable Candidate For Sedation?     Yes    Risks & benefits of procedure and alternative therapy have been explained to the patient including but not limited to: allergic reaction, bleeding with possible need for blood transfusion, infection, renal and vascular compromise, limb damage, arrhythmia, stroke, vessel dissection/perforation, myocardial infarction, and emergent CABG. Informed consent obtained at bedside and included in chart. 69 yo Cantonese Speaking M, former smoker, with a PMH of HTN, HLD, PVD, CAD s/p PCI of LAD in 9/2023 (residual RCA/D1 disease), known kidney cyst (6.1 cm) and thyroid nodule (L) who presented to outpatient cardiologist Dr. Laboy for follow up after recent cardiac cath reporting improved symptoms however still has some episodes of mild chest pain; In light of risk factors and known residual disease, patient presents for staged left heart cardiac catheterization with planned intervention.     EKG: 	NSR 72bpm; 1mm PRANAY V2, flattened TW III, AVL; nearly meets voltage criteria for LVH in precordial leads		  ASA 	III  Mallampati class: II  Anginal Class: II    -No Known Allergies    -H/H = 15.1/44.5  . Pt denies BRBPR, hematuria, hematochezia, melena. Pt endorses compliance w/ home Aspirin 81mg and Plavix 75mg, stating last dose was today AM. Pt endorses full compliance with DAPT in the last two weeks.  -BUN/Cr = 21/0.86  . EF 58%. Euvolemic on exam. IV NS @ 250cc bolus followed by 75cc/hr x 2hrs started pre procedure    Sedation Plan:   Moderate  Patient Is Suitable Candidate For Sedation?     Yes    Risks & benefits of procedure and alternative therapy have been explained to the patient including but not limited to: allergic reaction, bleeding with possible need for blood transfusion, infection, renal and vascular compromise, limb damage, arrhythmia, stroke, vessel dissection/perforation, myocardial infarction, and emergent CABG. Informed consent obtained at bedside and included in chart. 69 yo Cantonese Speaking M, former smoker, with a PMH of HTN, HLD, PVD, CAD s/p PCI of LAD in 9/2023 (residual RCA/D1 disease), known kidney cyst (6.1 cm) and thyroid nodule (L) who presented to outpatient cardiologist Dr. Laboy for follow up after recent cardiac cath reporting improved symptoms however still has some episodes of mild chest pain; In light of risk factors and known residual disease, patient presents for staged left heart cardiac catheterization with planned intervention.     EKG: 	NSR 72bpm; 1mm PRANAY V2, flattened TW III, AVL; nearly meets voltage criteria for LVH in precordial leads		  ASA 	III  Mallampati class: II  Anginal Class: II    -No Known Allergies    -H/H = 15.1/44.5  . Pt denies BRBPR, hematuria, hematochezia, melena. Pt endorses compliance w/ home Aspirin 81mg and Plavix 75mg, stating last dose was today AM. Pt endorses full compliance with DAPT in the last two weeks.  -BUN/Cr = 21/0.86  . EF 58%. Euvolemic on exam. IV NS @ 250cc bolus followed by 75cc/hr x 2hrs started pre procedure    Sedation Plan:   Moderate  Patient Is Suitable Candidate For Sedation?     Yes    [id number] Cantonese Language Line utilized: Risks & benefits of procedure and alternative therapy have been explained to the patient including but not limited to: allergic reaction, bleeding with possible need for blood transfusion, infection, renal and vascular compromise, limb damage, arrhythmia, stroke, vessel dissection/perforation, myocardial infarction, and emergent CABG. Informed consent obtained at bedside and included in chart. 67 yo Cantonese Speaking M, former smoker, with a PMH of HTN, HLD, PVD, CAD s/p PCI of LAD in 9/2023 (residual RCA/D1 disease), known kidney cyst (6.1 cm) and thyroid nodule (L) who presented to outpatient cardiologist Dr. Laboy for follow up after recent cardiac cath reporting improved symptoms however still has some episodes of mild chest pain; In light of risk factors and known residual disease, patient presents for staged left heart cardiac catheterization with planned intervention.     EKG: 	NSR 72bpm; 1mm PRANAY V2, flattened TW III, AVL; nearly meets voltage criteria for LVH in precordial leads		  ASA 	III  Mallampati class: II  Anginal Class: II    -No Known Allergies    -H/H = 15.1/44.5  . Pt denies BRBPR, hematuria, hematochezia, melena. Pt endorses compliance w/ home Aspirin 81mg and Plavix 75mg, stating last dose was today AM. Pt endorses full compliance with DAPT in the last two weeks.  -BUN/Cr = 21/0.86  . EF 58%. Euvolemic on exam. IV NS @ 250cc bolus followed by 75cc/hr x 2hrs started pre procedure    Sedation Plan:   Moderate  Patient Is Suitable Candidate For Sedation?     Yes    [id number] Cantonese Language Line utilized: Risks & benefits of procedure and alternative therapy have been explained to the patient including but not limited to: allergic reaction, bleeding with possible need for blood transfusion, infection, renal and vascular compromise, limb damage, arrhythmia, stroke, vessel dissection/perforation, myocardial infarction, and emergent CABG. Informed consent obtained at bedside and included in chart. 67 yo Cantonese Speaking M, former smoker, with a PMH of HTN, HLD, PVD, CAD s/p PCI of LAD in 9/2023 (residual RCA/D1 disease), known kidney cyst (6.1 cm) and thyroid nodule (L) who presented to outpatient cardiologist Dr. Laboy for follow up after recent cardiac cath reporting improved symptoms however still has some episodes of mild chest pain; In light of risk factors and known residual disease, patient presents for staged left heart cardiac catheterization with planned intervention.     EKG: 	NSR 72bpm; 1mm PRANAY V2, flattened TW III, AVL; nearly meets voltage criteria for LVH in precordial leads		  ASA 	III  Mallampati class: II  Anginal Class: II    -No Known Allergies    -H/H = 15.1/44.5  . Pt denies BRBPR, hematuria, hematochezia, melena. Pt endorses compliance w/ home Aspirin 81mg and Plavix 75mg, stating last dose was today AM. Pt endorses full compliance with DAPT in the last two weeks.  -BUN/Cr = 21/0.86  . EF 58%. Euvolemic on exam. IV NS @ 250cc bolus followed by 75cc/hr x 2hrs started pre procedure    Sedation Plan:   Moderate  Patient Is Suitable Candidate For Sedation?     Yes    [id number] Cantonese Language Line utilized: Risks & benefits of procedure and alternative therapy have been explained to the patient including but not limited to: allergic reaction, bleeding with possible need for blood transfusion, infection, renal and vascular compromise, limb damage, arrhythmia, stroke, vessel dissection/perforation, myocardial infarction, and emergent CABG. Informed consent obtained at bedside and included in chart.

## 2023-10-23 NOTE — H&P ADULT - NSICDXPASTMEDICALHX_GEN_ALL_CORE_FT
PAST MEDICAL HISTORY:  Carotid stenosis     History of thyroid nodule     HLD (hyperlipidemia)     HTN (hypertension)     Kidney cysts     PVD (peripheral vascular disease)

## 2023-10-23 NOTE — H&P ADULT - RESPIRATORY
no wheezes/no rales/no rhonchi/no respiratory distress/respirations non-labored/diminished breath sounds, L/diminished breath sounds, R

## 2023-10-25 ENCOUNTER — OUTPATIENT (OUTPATIENT)
Dept: OUTPATIENT SERVICES | Facility: HOSPITAL | Age: 68
LOS: 1 days | Discharge: HOME | End: 2023-10-25
Payer: MEDICARE

## 2023-10-25 DIAGNOSIS — Z95.5 PRESENCE OF CORONARY ANGIOPLASTY IMPLANT AND GRAFT: Chronic | ICD-10-CM

## 2023-10-25 LAB
A1C WITH ESTIMATED AVERAGE GLUCOSE RESULT: 5.9 % — HIGH (ref 4–5.6)
A1C WITH ESTIMATED AVERAGE GLUCOSE RESULT: 5.9 % — HIGH (ref 4–5.6)
ALBUMIN SERPL ELPH-MCNC: 4.6 G/DL — SIGNIFICANT CHANGE UP (ref 3.3–5)
ALBUMIN SERPL ELPH-MCNC: 4.6 G/DL — SIGNIFICANT CHANGE UP (ref 3.3–5)
ALP SERPL-CCNC: 65 U/L — SIGNIFICANT CHANGE UP (ref 40–120)
ALP SERPL-CCNC: 65 U/L — SIGNIFICANT CHANGE UP (ref 40–120)
ALT FLD-CCNC: 20 U/L — SIGNIFICANT CHANGE UP (ref 10–45)
ALT FLD-CCNC: 20 U/L — SIGNIFICANT CHANGE UP (ref 10–45)
ANION GAP SERPL CALC-SCNC: 7 MMOL/L — SIGNIFICANT CHANGE UP (ref 5–17)
ANION GAP SERPL CALC-SCNC: 7 MMOL/L — SIGNIFICANT CHANGE UP (ref 5–17)
ANION GAP SERPL CALC-SCNC: 8 MMOL/L — SIGNIFICANT CHANGE UP (ref 5–17)
ANION GAP SERPL CALC-SCNC: 8 MMOL/L — SIGNIFICANT CHANGE UP (ref 5–17)
APTT BLD: 32.1 SEC — SIGNIFICANT CHANGE UP (ref 24.5–35.6)
APTT BLD: 32.1 SEC — SIGNIFICANT CHANGE UP (ref 24.5–35.6)
AST SERPL-CCNC: 20 U/L — SIGNIFICANT CHANGE UP (ref 10–40)
AST SERPL-CCNC: 20 U/L — SIGNIFICANT CHANGE UP (ref 10–40)
BASOPHILS # BLD AUTO: 0.14 K/UL — SIGNIFICANT CHANGE UP (ref 0–0.2)
BASOPHILS # BLD AUTO: 0.14 K/UL — SIGNIFICANT CHANGE UP (ref 0–0.2)
BASOPHILS NFR BLD AUTO: 1.4 % — SIGNIFICANT CHANGE UP (ref 0–2)
BASOPHILS NFR BLD AUTO: 1.4 % — SIGNIFICANT CHANGE UP (ref 0–2)
BILIRUB SERPL-MCNC: 0.8 MG/DL — SIGNIFICANT CHANGE UP (ref 0.2–1.2)
BILIRUB SERPL-MCNC: 0.8 MG/DL — SIGNIFICANT CHANGE UP (ref 0.2–1.2)
BUN SERPL-MCNC: 18 MG/DL — SIGNIFICANT CHANGE UP (ref 7–23)
BUN SERPL-MCNC: 18 MG/DL — SIGNIFICANT CHANGE UP (ref 7–23)
BUN SERPL-MCNC: 21 MG/DL — SIGNIFICANT CHANGE UP (ref 7–23)
BUN SERPL-MCNC: 21 MG/DL — SIGNIFICANT CHANGE UP (ref 7–23)
CALCIUM SERPL-MCNC: 9 MG/DL — SIGNIFICANT CHANGE UP (ref 8.4–10.5)
CALCIUM SERPL-MCNC: 9 MG/DL — SIGNIFICANT CHANGE UP (ref 8.4–10.5)
CALCIUM SERPL-MCNC: 9.3 MG/DL — SIGNIFICANT CHANGE UP (ref 8.4–10.5)
CALCIUM SERPL-MCNC: 9.3 MG/DL — SIGNIFICANT CHANGE UP (ref 8.4–10.5)
CHLORIDE SERPL-SCNC: 104 MMOL/L — SIGNIFICANT CHANGE UP (ref 96–108)
CHOLEST SERPL-MCNC: 115 MG/DL — SIGNIFICANT CHANGE UP
CHOLEST SERPL-MCNC: 115 MG/DL — SIGNIFICANT CHANGE UP
CK MB CFR SERPL CALC: 2 NG/ML — SIGNIFICANT CHANGE UP (ref 0–6.7)
CK MB CFR SERPL CALC: 2 NG/ML — SIGNIFICANT CHANGE UP (ref 0–6.7)
CK SERPL-CCNC: 132 U/L — SIGNIFICANT CHANGE UP (ref 30–200)
CK SERPL-CCNC: 132 U/L — SIGNIFICANT CHANGE UP (ref 30–200)
CO2 SERPL-SCNC: 28 MMOL/L — SIGNIFICANT CHANGE UP (ref 22–31)
CREAT SERPL-MCNC: 0.86 MG/DL — SIGNIFICANT CHANGE UP (ref 0.5–1.3)
CREAT SERPL-MCNC: 0.86 MG/DL — SIGNIFICANT CHANGE UP (ref 0.5–1.3)
CREAT SERPL-MCNC: 0.94 MG/DL — SIGNIFICANT CHANGE UP (ref 0.5–1.3)
CREAT SERPL-MCNC: 0.94 MG/DL — SIGNIFICANT CHANGE UP (ref 0.5–1.3)
EGFR: 87 ML/MIN/1.73M2 — SIGNIFICANT CHANGE UP
EGFR: 87 ML/MIN/1.73M2 — SIGNIFICANT CHANGE UP
EGFR: 93 ML/MIN/1.73M2 — SIGNIFICANT CHANGE UP
EGFR: 93 ML/MIN/1.73M2 — SIGNIFICANT CHANGE UP
EOSINOPHIL # BLD AUTO: 0.44 K/UL — SIGNIFICANT CHANGE UP (ref 0–0.5)
EOSINOPHIL # BLD AUTO: 0.44 K/UL — SIGNIFICANT CHANGE UP (ref 0–0.5)
EOSINOPHIL NFR BLD AUTO: 4.3 % — SIGNIFICANT CHANGE UP (ref 0–6)
EOSINOPHIL NFR BLD AUTO: 4.3 % — SIGNIFICANT CHANGE UP (ref 0–6)
ESTIMATED AVERAGE GLUCOSE: 123 MG/DL — HIGH (ref 68–114)
ESTIMATED AVERAGE GLUCOSE: 123 MG/DL — HIGH (ref 68–114)
GLUCOSE SERPL-MCNC: 111 MG/DL — HIGH (ref 70–99)
GLUCOSE SERPL-MCNC: 111 MG/DL — HIGH (ref 70–99)
GLUCOSE SERPL-MCNC: 169 MG/DL — HIGH (ref 70–99)
GLUCOSE SERPL-MCNC: 169 MG/DL — HIGH (ref 70–99)
HCT VFR BLD CALC: 42 % — SIGNIFICANT CHANGE UP (ref 39–50)
HCT VFR BLD CALC: 42 % — SIGNIFICANT CHANGE UP (ref 39–50)
HCT VFR BLD CALC: 44.5 % — SIGNIFICANT CHANGE UP (ref 39–50)
HCT VFR BLD CALC: 44.5 % — SIGNIFICANT CHANGE UP (ref 39–50)
HDLC SERPL-MCNC: 48 MG/DL — SIGNIFICANT CHANGE UP
HDLC SERPL-MCNC: 48 MG/DL — SIGNIFICANT CHANGE UP
HGB BLD-MCNC: 14.4 G/DL — SIGNIFICANT CHANGE UP (ref 13–17)
HGB BLD-MCNC: 14.4 G/DL — SIGNIFICANT CHANGE UP (ref 13–17)
HGB BLD-MCNC: 15.1 G/DL — SIGNIFICANT CHANGE UP (ref 13–17)
HGB BLD-MCNC: 15.1 G/DL — SIGNIFICANT CHANGE UP (ref 13–17)
IMM GRANULOCYTES NFR BLD AUTO: 0.3 % — SIGNIFICANT CHANGE UP (ref 0–0.9)
IMM GRANULOCYTES NFR BLD AUTO: 0.3 % — SIGNIFICANT CHANGE UP (ref 0–0.9)
INR BLD: 0.96 — SIGNIFICANT CHANGE UP (ref 0.85–1.18)
INR BLD: 0.96 — SIGNIFICANT CHANGE UP (ref 0.85–1.18)
LIPID PNL WITH DIRECT LDL SERPL: 51 MG/DL — SIGNIFICANT CHANGE UP
LIPID PNL WITH DIRECT LDL SERPL: 51 MG/DL — SIGNIFICANT CHANGE UP
LYMPHOCYTES # BLD AUTO: 2.25 K/UL — SIGNIFICANT CHANGE UP (ref 1–3.3)
LYMPHOCYTES # BLD AUTO: 2.25 K/UL — SIGNIFICANT CHANGE UP (ref 1–3.3)
LYMPHOCYTES # BLD AUTO: 22 % — SIGNIFICANT CHANGE UP (ref 13–44)
LYMPHOCYTES # BLD AUTO: 22 % — SIGNIFICANT CHANGE UP (ref 13–44)
MAGNESIUM SERPL-MCNC: 2.3 MG/DL — SIGNIFICANT CHANGE UP (ref 1.6–2.6)
MCHC RBC-ENTMCNC: 31.5 PG — SIGNIFICANT CHANGE UP (ref 27–34)
MCHC RBC-ENTMCNC: 33.9 GM/DL — SIGNIFICANT CHANGE UP (ref 32–36)
MCHC RBC-ENTMCNC: 33.9 GM/DL — SIGNIFICANT CHANGE UP (ref 32–36)
MCHC RBC-ENTMCNC: 34.3 GM/DL — SIGNIFICANT CHANGE UP (ref 32–36)
MCHC RBC-ENTMCNC: 34.3 GM/DL — SIGNIFICANT CHANGE UP (ref 32–36)
MCV RBC AUTO: 91.9 FL — SIGNIFICANT CHANGE UP (ref 80–100)
MCV RBC AUTO: 91.9 FL — SIGNIFICANT CHANGE UP (ref 80–100)
MCV RBC AUTO: 92.7 FL — SIGNIFICANT CHANGE UP (ref 80–100)
MCV RBC AUTO: 92.7 FL — SIGNIFICANT CHANGE UP (ref 80–100)
MONOCYTES # BLD AUTO: 1.15 K/UL — HIGH (ref 0–0.9)
MONOCYTES # BLD AUTO: 1.15 K/UL — HIGH (ref 0–0.9)
MONOCYTES NFR BLD AUTO: 11.2 % — SIGNIFICANT CHANGE UP (ref 2–14)
MONOCYTES NFR BLD AUTO: 11.2 % — SIGNIFICANT CHANGE UP (ref 2–14)
NEUTROPHILS # BLD AUTO: 6.22 K/UL — SIGNIFICANT CHANGE UP (ref 1.8–7.4)
NEUTROPHILS # BLD AUTO: 6.22 K/UL — SIGNIFICANT CHANGE UP (ref 1.8–7.4)
NEUTROPHILS NFR BLD AUTO: 60.8 % — SIGNIFICANT CHANGE UP (ref 43–77)
NEUTROPHILS NFR BLD AUTO: 60.8 % — SIGNIFICANT CHANGE UP (ref 43–77)
NON HDL CHOLESTEROL: 67 MG/DL — SIGNIFICANT CHANGE UP
NON HDL CHOLESTEROL: 67 MG/DL — SIGNIFICANT CHANGE UP
NRBC # BLD: 0 /100 WBCS — SIGNIFICANT CHANGE UP (ref 0–0)
PLATELET # BLD AUTO: 244 K/UL — SIGNIFICANT CHANGE UP (ref 150–400)
PLATELET # BLD AUTO: 244 K/UL — SIGNIFICANT CHANGE UP (ref 150–400)
PLATELET # BLD AUTO: 251 K/UL — SIGNIFICANT CHANGE UP (ref 150–400)
PLATELET # BLD AUTO: 251 K/UL — SIGNIFICANT CHANGE UP (ref 150–400)
POTASSIUM SERPL-MCNC: 4 MMOL/L — SIGNIFICANT CHANGE UP (ref 3.5–5.3)
POTASSIUM SERPL-MCNC: 4 MMOL/L — SIGNIFICANT CHANGE UP (ref 3.5–5.3)
POTASSIUM SERPL-MCNC: 4.1 MMOL/L — SIGNIFICANT CHANGE UP (ref 3.5–5.3)
POTASSIUM SERPL-MCNC: 4.1 MMOL/L — SIGNIFICANT CHANGE UP (ref 3.5–5.3)
POTASSIUM SERPL-SCNC: 4 MMOL/L — SIGNIFICANT CHANGE UP (ref 3.5–5.3)
POTASSIUM SERPL-SCNC: 4 MMOL/L — SIGNIFICANT CHANGE UP (ref 3.5–5.3)
POTASSIUM SERPL-SCNC: 4.1 MMOL/L — SIGNIFICANT CHANGE UP (ref 3.5–5.3)
POTASSIUM SERPL-SCNC: 4.1 MMOL/L — SIGNIFICANT CHANGE UP (ref 3.5–5.3)
PROT SERPL-MCNC: 7.4 G/DL — SIGNIFICANT CHANGE UP (ref 6–8.3)
PROT SERPL-MCNC: 7.4 G/DL — SIGNIFICANT CHANGE UP (ref 6–8.3)
PROTHROM AB SERPL-ACNC: 11 SEC — SIGNIFICANT CHANGE UP (ref 9.5–13)
PROTHROM AB SERPL-ACNC: 11 SEC — SIGNIFICANT CHANGE UP (ref 9.5–13)
RBC # BLD: 4.57 M/UL — SIGNIFICANT CHANGE UP (ref 4.2–5.8)
RBC # BLD: 4.57 M/UL — SIGNIFICANT CHANGE UP (ref 4.2–5.8)
RBC # BLD: 4.8 M/UL — SIGNIFICANT CHANGE UP (ref 4.2–5.8)
RBC # BLD: 4.8 M/UL — SIGNIFICANT CHANGE UP (ref 4.2–5.8)
RBC # FLD: 12.8 % — SIGNIFICANT CHANGE UP (ref 10.3–14.5)
RBC # FLD: 12.8 % — SIGNIFICANT CHANGE UP (ref 10.3–14.5)
RBC # FLD: 12.9 % — SIGNIFICANT CHANGE UP (ref 10.3–14.5)
RBC # FLD: 12.9 % — SIGNIFICANT CHANGE UP (ref 10.3–14.5)
SODIUM SERPL-SCNC: 139 MMOL/L — SIGNIFICANT CHANGE UP (ref 135–145)
SODIUM SERPL-SCNC: 139 MMOL/L — SIGNIFICANT CHANGE UP (ref 135–145)
SODIUM SERPL-SCNC: 140 MMOL/L — SIGNIFICANT CHANGE UP (ref 135–145)
SODIUM SERPL-SCNC: 140 MMOL/L — SIGNIFICANT CHANGE UP (ref 135–145)
TRIGL SERPL-MCNC: 80 MG/DL — SIGNIFICANT CHANGE UP
TRIGL SERPL-MCNC: 80 MG/DL — SIGNIFICANT CHANGE UP
WBC # BLD: 10.23 K/UL — SIGNIFICANT CHANGE UP (ref 3.8–10.5)
WBC # BLD: 10.23 K/UL — SIGNIFICANT CHANGE UP (ref 3.8–10.5)
WBC # BLD: 10.27 K/UL — SIGNIFICANT CHANGE UP (ref 3.8–10.5)
WBC # BLD: 10.27 K/UL — SIGNIFICANT CHANGE UP (ref 3.8–10.5)
WBC # FLD AUTO: 10.23 K/UL — SIGNIFICANT CHANGE UP (ref 3.8–10.5)
WBC # FLD AUTO: 10.23 K/UL — SIGNIFICANT CHANGE UP (ref 3.8–10.5)
WBC # FLD AUTO: 10.27 K/UL — SIGNIFICANT CHANGE UP (ref 3.8–10.5)
WBC # FLD AUTO: 10.27 K/UL — SIGNIFICANT CHANGE UP (ref 3.8–10.5)

## 2023-10-25 PROCEDURE — 99152 MOD SED SAME PHYS/QHP 5/>YRS: CPT

## 2023-10-25 PROCEDURE — 80061 LIPID PANEL: CPT

## 2023-10-25 PROCEDURE — C1887: CPT

## 2023-10-25 PROCEDURE — 92978 ENDOLUMINL IVUS OCT C 1ST: CPT | Mod: 26,RC

## 2023-10-25 PROCEDURE — C9600: CPT | Mod: RC

## 2023-10-25 PROCEDURE — 92928 PRQ TCAT PLMT NTRAC ST 1 LES: CPT | Mod: RC

## 2023-10-25 PROCEDURE — 83036 HEMOGLOBIN GLYCOSYLATED A1C: CPT

## 2023-10-25 PROCEDURE — 36415 COLL VENOUS BLD VENIPUNCTURE: CPT

## 2023-10-25 PROCEDURE — 85730 THROMBOPLASTIN TIME PARTIAL: CPT

## 2023-10-25 PROCEDURE — C1725: CPT

## 2023-10-25 PROCEDURE — 93010 ELECTROCARDIOGRAM REPORT: CPT | Mod: 77

## 2023-10-25 PROCEDURE — 80053 COMPREHEN METABOLIC PANEL: CPT

## 2023-10-25 PROCEDURE — C1894: CPT

## 2023-10-25 PROCEDURE — 99153 MOD SED SAME PHYS/QHP EA: CPT

## 2023-10-25 PROCEDURE — 85610 PROTHROMBIN TIME: CPT

## 2023-10-25 PROCEDURE — C1753: CPT

## 2023-10-25 PROCEDURE — C1769: CPT

## 2023-10-25 PROCEDURE — 82553 CREATINE MB FRACTION: CPT

## 2023-10-25 PROCEDURE — 82550 ASSAY OF CK (CPK): CPT

## 2023-10-25 PROCEDURE — 93010 ELECTROCARDIOGRAM REPORT: CPT

## 2023-10-25 PROCEDURE — 93005 ELECTROCARDIOGRAM TRACING: CPT

## 2023-10-25 PROCEDURE — 85027 COMPLETE CBC AUTOMATED: CPT

## 2023-10-25 PROCEDURE — 80048 BASIC METABOLIC PNL TOTAL CA: CPT

## 2023-10-25 PROCEDURE — C1874: CPT

## 2023-10-25 PROCEDURE — 92978 ENDOLUMINL IVUS OCT C 1ST: CPT | Mod: RC

## 2023-10-25 PROCEDURE — 83735 ASSAY OF MAGNESIUM: CPT

## 2023-10-25 PROCEDURE — 85025 COMPLETE CBC W/AUTO DIFF WBC: CPT

## 2023-10-25 RX ORDER — CLOPIDOGREL BISULFATE 75 MG/1
1 TABLET, FILM COATED ORAL
Qty: 30 | Refills: 11
Start: 2023-10-25 | End: 2024-10-18

## 2023-10-25 RX ORDER — NITROGLYCERIN 6.5 MG
1 CAPSULE, EXTENDED RELEASE ORAL
Refills: 0 | DISCHARGE

## 2023-10-25 RX ORDER — ASPIRIN/CALCIUM CARB/MAGNESIUM 324 MG
1 TABLET ORAL
Qty: 30 | Refills: 11
Start: 2023-10-25 | End: 2024-10-18

## 2023-10-25 RX ORDER — SODIUM CHLORIDE 9 MG/ML
1000 INJECTION INTRAMUSCULAR; INTRAVENOUS; SUBCUTANEOUS
Refills: 0 | Status: DISCONTINUED | OUTPATIENT
Start: 2023-10-25 | End: 2023-11-08

## 2023-10-25 RX ORDER — LOSARTAN POTASSIUM 100 MG/1
1 TABLET, FILM COATED ORAL
Refills: 0 | DISCHARGE

## 2023-10-25 RX ORDER — ASPIRIN/CALCIUM CARB/MAGNESIUM 324 MG
81 TABLET ORAL ONCE
Refills: 0 | Status: COMPLETED | OUTPATIENT
Start: 2023-10-25 | End: 2023-10-25

## 2023-10-25 RX ORDER — SODIUM CHLORIDE 9 MG/ML
250 INJECTION INTRAMUSCULAR; INTRAVENOUS; SUBCUTANEOUS ONCE
Refills: 0 | Status: COMPLETED | OUTPATIENT
Start: 2023-10-25 | End: 2023-10-25

## 2023-10-25 RX ORDER — SODIUM CHLORIDE 9 MG/ML
500 INJECTION INTRAMUSCULAR; INTRAVENOUS; SUBCUTANEOUS
Refills: 0 | Status: COMPLETED | OUTPATIENT
Start: 2023-10-25 | End: 2023-10-25

## 2023-10-25 RX ORDER — CARVEDILOL PHOSPHATE 80 MG/1
1 CAPSULE, EXTENDED RELEASE ORAL
Refills: 0 | DISCHARGE

## 2023-10-25 RX ORDER — ASPIRIN/CALCIUM CARB/MAGNESIUM 324 MG
1 TABLET ORAL
Refills: 0 | DISCHARGE

## 2023-10-25 RX ORDER — CLOPIDOGREL BISULFATE 75 MG/1
75 TABLET, FILM COATED ORAL ONCE
Refills: 0 | Status: COMPLETED | OUTPATIENT
Start: 2023-10-25 | End: 2023-10-25

## 2023-10-25 RX ADMIN — SODIUM CHLORIDE 210 MILLILITER(S): 9 INJECTION INTRAMUSCULAR; INTRAVENOUS; SUBCUTANEOUS at 14:30

## 2023-10-25 RX ADMIN — SODIUM CHLORIDE 210 MILLILITER(S): 9 INJECTION INTRAMUSCULAR; INTRAVENOUS; SUBCUTANEOUS at 14:28

## 2023-10-25 RX ADMIN — SODIUM CHLORIDE 500 MILLILITER(S): 9 INJECTION INTRAMUSCULAR; INTRAVENOUS; SUBCUTANEOUS at 11:46

## 2023-10-25 RX ADMIN — SODIUM CHLORIDE 75 MILLILITER(S): 9 INJECTION INTRAMUSCULAR; INTRAVENOUS; SUBCUTANEOUS at 11:47

## 2023-10-25 RX ADMIN — SODIUM CHLORIDE 210 MILLILITER(S): 9 INJECTION INTRAMUSCULAR; INTRAVENOUS; SUBCUTANEOUS at 16:51

## 2023-10-25 NOTE — PROGRESS NOTE ADULT - SUBJECTIVE AND OBJECTIVE BOX
Interventional Cardiology PA Post PCI SDA Discharge Note    Patient without complaints. Ambulated and voided without difficulties  Afebrile, VSS  Ext: Right Radial :  no  hematoma,  no   bleeding, dressing; C/D/I  Pulses:    intact RAD to baseline     A/P:    s/p cardiac cath (10/25/23): BARBY x2 p/mRCA (staged procedure).  ACCESS: Right radial artery   Post cath IVF: NS 210cc/hr x4hrs    1. Follow-up with PMD/Cardiologist Dr. Laboy in 72 hours.  2. Post procedure labs/EKG reviewed and stable.    3. Pt given instructions on importance of taking antiplatelet medication.    4. Stable for discharge as per attending Dr. Larry after bed rest, pt voids, groin/wrist stable and 30 minutes of ambulation.  5. Prescriptions for Aspirin/Plavix e-prescribed adn submitted to patient's pharmacy.    6. Patient will continue statin Atorvastatin 40mg PO QHS  7. Discharge forms signed and copies in chart

## 2023-10-26 DIAGNOSIS — I25.110 ATHEROSCLEROTIC HEART DISEASE OF NATIVE CORONARY ARTERY WITH UNSTABLE ANGINA PECTORIS: ICD-10-CM

## 2023-10-26 DIAGNOSIS — I25.84 CORONARY ATHEROSCLEROSIS DUE TO CALCIFIED CORONARY LESION: ICD-10-CM
